# Patient Record
Sex: MALE | Race: WHITE
[De-identification: names, ages, dates, MRNs, and addresses within clinical notes are randomized per-mention and may not be internally consistent; named-entity substitution may affect disease eponyms.]

---

## 2019-08-02 ENCOUNTER — HOSPITAL ENCOUNTER (EMERGENCY)
Dept: HOSPITAL 56 - MW.ED | Age: 59
Discharge: HOME | End: 2019-08-02
Payer: COMMERCIAL

## 2019-08-02 DIAGNOSIS — M54.32: Primary | ICD-10-CM

## 2019-08-02 PROCEDURE — 99283 EMERGENCY DEPT VISIT LOW MDM: CPT

## 2019-08-02 PROCEDURE — 96372 THER/PROPH/DIAG INJ SC/IM: CPT

## 2019-08-02 NOTE — EDM.PDOC
ED HPI GENERAL MEDICAL PROBLEM





- General


Chief Complaint: Back Pain or Injury


Stated Complaint: LOW BACK AND HIP PAIN


Time Seen by Provider: 08/02/19 10:26


Source of Information: Reports: Patient


History Limitations: Reports: No Limitations





- History of Present Illness


INITIAL COMMENTS - FREE TEXT/NARRATIVE: 


HISTORY AND PHYSICAL:





History of present illness:


Patient is a 58-year-old male who presents to the emergency room with 

complaints of left hip pain which is worse over the past 2 days. He states he 

has been dealing with this left hip and sciatic pain for several months. He has 

been seeing Dr. Howard at [facility], who has been managing his pain. He 

did have a lumbar spine x-ray with left hip which showed arthritis but no acute 

findings. He also had an MRI scheduled in June 2019, but his insurance denied 

the claim, and he did not receive this imagine. He has been using pain 

medication (can't remember the name of medication) as needed, which has helped 

alleviate his discomfort somewhat. Over the past 2 days he feels that the pain 

is worse and has been seeing a chiropractor. Chiropractor has been performing 

acupuncture with minimal relief of the patient. The chiropractor recommended 

that the patient get a muscle relaxer. He describes the pain as a sharp burning 

sensation that starts to the left lateral hip and wraps to the front of the 

thigh. He denies any numbness, tingling, weakness or lumbar back pain.





No recent injury, trauma or falls. Patient denies any fever, chills, headache, 

change in vision, syncope or near syncope. Denies any chest pain, back pain, 

shortness of breath or cough. Denies any GI or  symptoms. He denies any motor 

function deficit or weakness. Patient has been eating and drinking 

appropriately.





Review of systems: 


As per history of present illness and below otherwise all systems reviewed and 

negative.





Past medical history: 


As per history of present illness and as reviewed below otherwise 

noncontributory.





Surgical history: 


As per history of present illness and as reviewed below otherwise 

noncontributory.





Social history: 


See social history for further information





Family history: 


As per history of present illness and as reviewed below otherwise 

noncontributory.





Physical exam:


General: Well-developed and well-nourished 58-year-old male. Alert and 

oriented. Nontoxic appearing and in no acute distress.


HEENT: Atraumatic, normocephalic, pupils equal and reactive bilaterally, 

negative for conjunctival pallor or scleral icterus, mucous membranes moist, 

trachea midline. No drooling or trismus noted. No meningeal signs. No hot 

potato voice noted. 


Lungs: Clear to auscultation, breath sounds equal bilaterally, chest nontender.


Heart: S1S2, regular rate and rhythm without overt murmur


Abdomen: Soft, nondistended, nontender. Negative for masses. Negative for 

costovertebral tenderness. Pelvis is stable and nontender.


Skin: Intact, warm, dry. No lesions or rashes noted.


Extremities: Atraumatic, moves all extremities per self without difficulty or 

deficits, negative for cords or calf pain. There is no palpable pain or 

deficits of the groin or iliac crest/hip. Neurovascular unremarkable.


C-spine/Back: No pinpoint vertebral tenderness upon palpation. No crepitus, step

-offs or obvious deformities. Patient is ambulatory into the emergency room 

without difficulty or deficit. Able to rock back on heels and walk on toes. 

Denies any urinary or fecal incontinence. Denies any numbness, tingling or 

saddle paresthesia. 


Neuro: Awake, alert, oriented. Cranial nerves II through XII unremarkable. 

Cerebellum unremarkable. Motor and sensory unremarkable throughout. Exam 

nonfocal.





Notes:


I did offer imaging to the patient at this time, he declines. He states he will 

follow-up with Dr. Howard for further imagining. At this time I will give him 

a steroid along with IM Dilaudid now as he does have a ride (wife). We 

discussed doing a Medrol Dosepak with Flexeril/diclofenac for home. The wife 

states she will call today to set up an follow-up appointment with Anita for 

next week. We discussed signs and symptoms that would prompt him to return to 

the emergency room. Supportive care measures were reviewed and discussed. 

Voices understanding and is agreeable to plan of care. Denies any further 

questions or concerns at this time. Patient was able to ambulate out of the 

emergency department with an even and steady gait.





Diagnostics:


Declines





Therapeutics:


Solu-Medrol, Dilaudid





Prescription:


Diclofenac


Flexeril


Medrol Dosepak





Impression: 


Sciatica, left


Left Hip Pain





Plan:


1. The medication you received today does cause drowsiness, so do not drive for 

the remaining day


2. When resting please lay on a flat firm surface. Limit your immobility to 

prevent muscle stiffness. Get up to ambulate/move around/gentle stretching 

multiple times throughout the day. May alternate heat and ice to the painful 

areas


3. Tylenol as needed for back pain. Otherwise take the prescribed Flexeril and 

diclofenac as directed. Diclofenac is an anti-inflammatory so do not take any 

additional NSAIDs with this medication, such as ibuprofen or Aleve. Flexeril as 

a muscle relaxant, this medication may cause drowsiness a do not take it will 

driving her needing to be functioning outside of the house.


4. Please follow-up with your primary care provider as we discussed. Return to 

the ED as needed and as discussed.





Definitive disposition and diagnosis as appropriate pending reevaluation and 

review of above.





  ** Left hip


Pain Score (Numeric/FACES): 6





- Related Data


 Allergies











Allergy/AdvReac Type Severity Reaction Status Date / Time


 


No Known Allergies Allergy   Verified 08/02/19 10:30














ED ROS GENERAL





- Review of Systems


Review Of Systems: ROS reveals no pertinent complaints other than HPI.





ED EXAM,LOWER BACK PAIN/INJURY





- Physical Exam


Exam: See Below (See dictation)





Course





- Vital Signs


Last Recorded V/S: 


 Last Vital Signs











Temp  98.7 F   08/02/19 10:26


 


Pulse  51 L  08/02/19 11:01


 


Resp  19   08/02/19 11:01


 


BP  161/80 H  08/02/19 11:01


 


Pulse Ox  99   08/02/19 11:01














- Orders/Labs/Meds


Meds: 


Medications














Discontinued Medications














Generic Name Dose Route Start Last Admin





  Trade Name Yana  PRN Reason Stop Dose Admin


 


Hydromorphone HCl  1 mg  08/02/19 10:41  08/02/19 10:53





  Dilaudid  IM  08/02/19 10:42  1 mg





  ONETIME ONE   Administration





     





     





     





     


 


Hydromorphone HCl  Confirm  08/02/19 10:49  08/02/19 10:58





  Dilaudid  Administered  08/02/19 10:50  Not Given





  Dose   





  1 mg   





  .ROUTE   





  .STK-MED ONE   





     





     





     





     


 


Methylprednisolone Sodium Succinate  125 mg  08/02/19 10:41  08/02/19 10:53





  Solu-Medrol  IM  08/02/19 10:42  125 mg





  ONETIME ONE   Administration





     





     





     





     














Departure





- Departure


Time of Disposition: 10:56


Disposition: Home, Self-Care 01


Clinical Impression: 


 Hip pain, left





Sciatica


Qualifiers:


 Laterality: left Qualified Code(s): M54.32 - Sciatica, left side








- Discharge Information


Instructions:  Sciatica, Easy-to-Read


Referrals: 


Jovana Howard DO [Primary Care Provider] - 


Forms:  ED Department Discharge


Additional Instructions: 


The following information is given to patients seen in the emergency department 

who are being discharged to home. This information is to outline your options 

for follow-up care. We provide all patients seen in our emergency department 

with a follow-up referral.





The need for follow-up, as well as the timing and circumstances, are variable 

depending upon the specifics of your emergency department visit.





If you don't have a primary care physician on staff, we will provide you with a 

referral. We always advise you to contact your personal physician following an 

emergency department visit to inform them of the circumstance of the visit and 

for follow-up with them and/or the need for any referrals to a consulting 

specialist.





The emergency department will also refer you to a specialist when appropriate. 

This referral assures that you have the opportunity for follow-up care with a 

specialist. All of these measure are taken in an effort to provide you with 

optimal care, which includes your follow-up.





Under all circumstances we always encourage you to contact your private 

physician who remains a resource for coordinating your care. When calling for 

follow-up care, please make the office aware that this follow-up is from your 

recent emergency room visit. If for any reason you are refused follow-up, 

please contact the [facility] Emergency 

Department at [phone] and asked to speak to the emergency department 

charge nurse.





[facility]


Primary Care


[address] [place] [id number]


Phone: [phone]


Fax: [phone]





[address] [address]


Phone: [phone]


Fax: [phone]





1. The medication you received today does cause drowsiness, so do not drive for 

the remaining day


2. When resting please lay on a flat firm surface. Limit your immobility to 

prevent muscle stiffness. Get up to ambulate/move around/gentle stretching 

multiple times throughout the day. May alternate heat and ice to the painful 

areas


3. Tylenol as needed for back pain. Otherwise take the prescribed Flexeril and 

diclofenac as directed. Diclofenac is an anti-inflammatory so do not take any 

additional NSAIDs with this medication, such as ibuprofen or Aleve. Flexeril as 

a muscle relaxant, this medication may cause drowsiness a do not take it will 

driving her needing to be functioning outside of the house.


4. Please follow-up with your primary care provider as we discussed. Return to 

the ED as needed and as discussed.